# Patient Record
Sex: FEMALE | Race: WHITE | Employment: FULL TIME | URBAN - METROPOLITAN AREA
[De-identification: names, ages, dates, MRNs, and addresses within clinical notes are randomized per-mention and may not be internally consistent; named-entity substitution may affect disease eponyms.]

---

## 2018-10-01 ENCOUNTER — OFFICE VISIT (OUTPATIENT)
Dept: FAMILY MEDICINE | Facility: CLINIC | Age: 53
End: 2018-10-01
Payer: COMMERCIAL

## 2018-10-01 VITALS
BODY MASS INDEX: 23.6 KG/M2 | RESPIRATION RATE: 18 BRPM | SYSTOLIC BLOOD PRESSURE: 100 MMHG | WEIGHT: 125 LBS | HEIGHT: 61 IN | DIASTOLIC BLOOD PRESSURE: 60 MMHG | TEMPERATURE: 98.3 F | OXYGEN SATURATION: 100 % | HEART RATE: 90 BPM

## 2018-10-01 DIAGNOSIS — I10 ESSENTIAL HYPERTENSION: Primary | ICD-10-CM

## 2018-10-01 DIAGNOSIS — G43.909 MIGRAINE WITHOUT STATUS MIGRAINOSUS, NOT INTRACTABLE, UNSPECIFIED MIGRAINE TYPE: ICD-10-CM

## 2018-10-01 PROCEDURE — 99203 OFFICE O/P NEW LOW 30 MIN: CPT | Performed by: FAMILY MEDICINE

## 2018-10-01 RX ORDER — LISINOPRIL 5 MG/1
5 TABLET ORAL DAILY
COMMUNITY

## 2018-10-01 RX ORDER — SUMATRIPTAN 100 MG/1
100 TABLET, FILM COATED ORAL
Qty: 18 TABLET | Refills: 3 | Status: SHIPPED | OUTPATIENT
Start: 2018-10-01

## 2018-10-01 NOTE — PROGRESS NOTES
"  SUBJECTIVE:   Jimena Mcgee is a 53 year old female who presents to clinic today for the following health issues:      Chief Complaint   Patient presents with     New Patient     discuss medications and getting PCP      Hypertension       Hypertension Follow-up      Outpatient blood pressures are not being checked.    Low Salt Diet: no added salt    Migraine Follow-Up    Headaches symptoms:  Improved     Frequency: 1 mo.     Duration of headaches: 2 days     Able to do normal daily activities/work with migraines: Yes    Rescue/Relief medication:sumatriptan (Imitrex)              Effectiveness: moderate relief    Preventative medication: None    Neurologic complications: No new stroke-like symptoms, loss of vision or speech, numbness or weakness    In the past 4 weeks, how often have you gone to Urgent Care or the emergency room because of your headaches?  0      Amount of exercise or physical activity: 4-5 days/week for an average of 45-60 minutes    Problems taking medications regularly: No    Medication side effects: none    Diet: regular (no restrictions)            Problem list and histories reviewed & adjusted, as indicated.  Additional history:         Reviewed and updated as needed this visit by clinical staff  Tobacco  Allergies  Med Hx  Surg Hx  Fam Hx  Soc Hx      Reviewed and updated as needed this visit by Provider       Further history obtained, clarified or corrected by physician:  She is new to this clinic and comes in just to get established.  She has hypertension and is on low-dose medication and also has migraines occasionally.  She says she had blood work done last March at her previous home and it was all normal.    OBJECTIVE:  /60  Pulse 90  Temp 98.3  F (36.8  C)  Resp 18  Ht 5' 0.5\" (1.537 m)  Wt 125 lb (56.7 kg)  LMP 10/01/2013  SpO2 100%  BMI 24.01 kg/m2  LUNGS: clear to auscultation, normal breath sounds  CV: RRR without murmur  ABD: BS+, soft, nontender, no masses, " no hepatosplenomegaly    ASSESSMENT:     Essential hypertension  Migraine without status migrainosus, not intractable, unspecified migraine type    PLAN:  Recommend getting old records sent  Routine healthcare maintenance to include full physical next spring    Orders Placed This Encounter     lisinopril (PRINIVIL/ZESTRIL) 5 MG tablet     DISCONTD: SUMATRIPTAN SUCCINATE PO     SUMAtriptan (IMITREX) 100 MG tablet

## 2018-10-01 NOTE — PATIENT INSTRUCTIONS
Thank you for choosing [facility].  You may be receiving a survey in the mail from [facility] regarding your visit today.  Please take a few minutes to complete and return the survey to let us know how we are doing.      Our Clinic hours are:  Mondays    7:20 am - 7 pm  Tues - Fri  7:20 am - 5 pm    Clinic Phone: [phone]    The clinic lab opens at 7:30 am Mon - Fri and appointments are required.    [facility]. [phone]  Monday  8 am - 7pm  Tues - Fri 8 am - 5:30 pm

## 2018-10-01 NOTE — MR AVS SNAPSHOT
After Visit Summary   10/1/2018    Jimena Mcgee    MRN: [id number]           Patient Information     Date Of Birth          1965        Visit Information        Provider Department      10/1/2018 1:00 PM Davie Lucio MD [facility]        Today's Diagnoses     Essential hypertension    -  1    Migraine without status migrainosus, not intractable, unspecified migraine type          Care Instructions          Thank you for choosing [facility]  You may be receiving a survey in the mail from [facility] regarding your visit today.  Please take a few minutes to complete and return the survey to let us know how we are doing.      Our Clinic hours are:  Mondays    7:20 am - 7 pm  Tues -  Fri  7:20 am - 5 pm    Clinic Phone: [phone]    The clinic lab opens at 7:30 am Mon - Fri and appointments are required.    [facility]  Ph. [phone]  Monday  8 am - 7pm  Tues - Fri 8 am - 5:30 pm                 Follow-ups after your visit        Who to contact     If you have questions or need follow up information about today's clinic visit or your schedule please contact [facility] directly at [phone].  Normal or non-critical lab and imaging results will be communicated to you by MyChart, letter or phone within 4 business days after the clinic has received the results. If you do not hear from us within 7 days, please contact the clinic through MyChart or phone. If you have a critical or abnormal lab result, we will notify you by phone as soon as possible.  Submit refill requests through [employer] or call your pharmacy and they will forward the refill request to us. Please allow 3 business days for your refill to be completed.          Additional Information About Your Visit        Care EveryWhere ID     This is your Care EveryWhere ID. This could be used by other organizations to access your [facility] medical records  [id number]       "  Your Vitals Were     Pulse Temperature Respirations Height Last Period Pulse Oximetry    90 98.3  F (36.8  C) 18 5' 0.5\" (1.537 m) 10/01/2013 100%    BMI (Body Mass Index)                   24.01 kg/m2            Blood Pressure from Last 3 Encounters:   10/01/18 100/60    Weight from Last 3 Encounters:   10/01/18 125 lb (56.7 kg)              Today, you had the following     No orders found for display         Today's Medication Changes          These changes are accurate as of 10/1/18  2:04 PM.  If you have any questions, ask your nurse or doctor.               These medicines have changed or have updated prescriptions.        Dose/Directions    SUMAtriptan 100 MG tablet   Commonly known as:  IMITREX   This may have changed:    - medication strength  - when to take this  - additional instructions   Used for:  Migraine without status migrainosus, not intractable, unspecified migraine type   Changed by:  Davie Lucio MD        Dose:  100 mg   Take 1 tablet (100 mg) by mouth at onset of headache for migraine May repeat in 2 hours. Max 2 tablets/24 hours.   Quantity:  18 tablet   Refills:  3            Where to get your medicines      These medications were sent to [facility] [place], [place] - [address]  [address] [facility] [address]     Phone:  [phone]     SUMAtriptan 100 MG tablet                Primary Care Provider Office Phone # Fax #    [facility] [phone]       [address] [place] [address]        Equal Access to Services     CRUZ MEZA AH: Hadii vanessa avilezo Sokayden, waaxda luqadaha, qaybta kaalmada adeegyada, fabiola blank adelouise davis. So [facility] [phone].    ATENCIÓN: Si habla español, tiene a mejia disposición servicios gratuitos de asistencia lingüística. Llame al [phone].    We comply with applicable federal civil rights laws and [place] laws. We do " not discriminate on the basis of race, color, national origin, age, disability, sex, sexual orientation, or gender identity.            Thank you!     Thank you for choosing [facility] your [facility]. Our goal is always to provide you with excellent care. Hearing back from our patients is one way we can continue to improve our services. Please take a few minutes to complete the written survey that you may receive in the mail after your visit with us. Thank you!             Your Updated Medication List - Protect others around you: Learn how to safely use, store and throw away your medicines at www.disposemymeds.org.          This list is accurate as of 10/1/18  2:04 PM.  Always use your most recent med list.                   Brand Name Dispense Instructions for use Diagnosis    lisinopril 5 MG tablet    PRINIVIL/ZESTRIL     Take 5 mg by mouth daily        SUMAtriptan 100 MG tablet    IMITREX    18 tablet    Take 1 tablet (100 mg) by mouth at onset of headache for migraine May repeat in 2 hours. Max 2 tablets/24 hours.    Migraine without status migrainosus, not intractable, unspecified migraine type

## 2018-10-09 PROBLEM — I10 ESSENTIAL HYPERTENSION: Status: ACTIVE | Noted: 2018-10-09

## 2018-10-09 PROBLEM — G43.909 MIGRAINE WITHOUT STATUS MIGRAINOSUS, NOT INTRACTABLE, UNSPECIFIED MIGRAINE TYPE: Status: ACTIVE | Noted: 2018-10-09

## 2019-04-29 ENCOUNTER — TELEPHONE (OUTPATIENT)
Dept: FAMILY MEDICINE | Facility: CLINIC | Age: 54
End: 2019-04-29

## 2019-04-29 NOTE — TELEPHONE ENCOUNTER
Panel Management Review      Patient has the following on her problem list:     Hypertension   Last three blood pressure readings:  BP Readings from Last 3 Encounters:   10/01/18 100/60     Blood pressure: Passed    HTN Guidelines:  Less than 140/90      Composite cancer screening  Chart review shows that this patient is due/due soon for the following Pap Smear and Mammogram  Summary:    Patient is due/failing the following:   MAMMOGRAM and PAP    Action needed:   Patient needs office visit for pap and mammogram.    Type of outreach:    Phone, left message for patient to call back.     Questions for provider review:    None                                                                                                                                    Aria Carrillo MA       Chart routed to Care Team .

## 2019-10-24 ENCOUNTER — TELEPHONE (OUTPATIENT)
Dept: FAMILY MEDICINE | Facility: CLINIC | Age: 54
End: 2019-10-24

## 2019-10-24 NOTE — TELEPHONE ENCOUNTER
Panel Management Review      Patient has the following on her problem list:     Hypertension   Last three blood pressure readings:  BP Readings from Last 3 Encounters:   10/01/18 100/60     Blood pressure: Passed    HTN Guidelines:  Less than 140/90      Composite cancer screening  Chart review shows that this patient is due/due soon for the following Pap Smear and Mammogram  Summary:    Patient is due/failing the following:   flu, MAMMOGRAM and PAP    Action needed:   Patient needs referral/order: pap and mammogram and flu    Type of outreach:    unable to reach. Will mail out letter.    Questions for provider review:    None                                                                                                                                    Aria Carrillo MA       Chart routed to Care Team .

## 2019-10-24 NOTE — LETTER
[facility]  [address]  [facility] [address]  Phone: [phone]      10/24/2019     Jimena Mcgee  [address] [place] [address]        Dear Jimena,  In order to ensure we are providing the best quality care, we have reviewed your chart and see that you are due for:  1 Pap/physical  2 mammogram  3 flu  Please call the clinic at your earliest convenience to schedule an appointment. If you previously completed elsewhere please let us know and we can update your medical chart.       Thank you for trusting us with your health care.      Sincerely,    Your [facility] Team/[facility]